# Patient Record
Sex: MALE | Race: WHITE | URBAN - METROPOLITAN AREA
[De-identification: names, ages, dates, MRNs, and addresses within clinical notes are randomized per-mention and may not be internally consistent; named-entity substitution may affect disease eponyms.]

---

## 2024-11-19 ENCOUNTER — OFFICE VISIT (OUTPATIENT)
Dept: URGENT CARE | Age: 29
End: 2024-11-19
Payer: COMMERCIAL

## 2024-11-19 VITALS
RESPIRATION RATE: 16 BRPM | OXYGEN SATURATION: 98 % | HEART RATE: 64 BPM | DIASTOLIC BLOOD PRESSURE: 79 MMHG | SYSTOLIC BLOOD PRESSURE: 114 MMHG | TEMPERATURE: 97.5 F

## 2024-11-19 DIAGNOSIS — J01.90 ACUTE NON-RECURRENT SINUSITIS, UNSPECIFIED LOCATION: Primary | ICD-10-CM

## 2024-11-19 PROCEDURE — 99203 OFFICE O/P NEW LOW 30 MIN: CPT | Performed by: PHYSICIAN ASSISTANT

## 2024-11-19 RX ORDER — AMOXICILLIN AND CLAVULANATE POTASSIUM 875; 125 MG/1; MG/1
1 TABLET, FILM COATED ORAL 2 TIMES DAILY
Qty: 20 TABLET | Refills: 0 | Status: SHIPPED | OUTPATIENT
Start: 2024-11-19 | End: 2024-11-29

## 2024-11-19 ASSESSMENT — ENCOUNTER SYMPTOMS
WHEEZING: 0
RHINORRHEA: 1
SHORTNESS OF BREATH: 0
EYE DISCHARGE: 0
COUGH: 0
SINUS PRESSURE: 1
CHEST TIGHTNESS: 0
EYE REDNESS: 0
FEVER: 0
SORE THROAT: 0
CHILLS: 0

## 2024-11-19 NOTE — PROGRESS NOTES
Subjective   Patient ID: Marcio He is a 29 y.o. male. They present today with a chief complaint of Sinus Problem (X 2-2.5wks).    History of Present Illness  Patient presents for possible sinus infection. He states he started with cold symptoms approximately 2 weeks ago. He states that he has had ongoing body aches, slight runny nose, headaches with slight sinus pressure. He denies any fevers, chills, ear pain, sore throat cough. He has tried over the counter cold medicine without relief.          Past Medical History  Allergies as of 11/19/2024    (No Known Allergies)       (Not in a hospital admission)       Past Medical History:   Diagnosis Date    Encounter for general adult medical examination without abnormal findings 12/04/2020    Routine general medical examination at a health care facility    Other conditions influencing health status     No significant past medical history    Other constipation 12/04/2020    Other constipation    Shortness of breath 12/04/2020    Mild shortness of breath       Past Surgical History:   Procedure Laterality Date    OTHER SURGICAL HISTORY  07/22/2019    Hernia repair    OTHER SURGICAL HISTORY  12/04/2020    Heron tooth extraction            Review of Systems  Review of Systems   Constitutional:  Negative for chills and fever.   HENT:  Positive for congestion, rhinorrhea and sinus pressure. Negative for ear pain and sore throat.    Eyes:  Negative for discharge and redness.   Respiratory:  Negative for cough, chest tightness, shortness of breath and wheezing.                                   Objective    Vitals:    11/19/24 0838   BP: 114/79   Pulse: 64   Resp: 16   Temp: 36.4 °C (97.5 °F)   SpO2: 98%     No LMP for male patient.    Physical Exam  Constitutional:       General: He is not in acute distress.     Appearance: Normal appearance. He is not ill-appearing.   HENT:      Right Ear: Tympanic membrane, ear canal and external ear normal.      Left Ear: Tympanic  membrane, ear canal and external ear normal.      Nose: Septal deviation, congestion and rhinorrhea present.      Right Sinus: Maxillary sinus tenderness present.      Left Sinus: Maxillary sinus tenderness present.      Mouth/Throat:      Pharynx: No pharyngeal swelling, oropharyngeal exudate or posterior oropharyngeal erythema.      Tonsils: No tonsillar exudate or tonsillar abscesses.   Eyes:      General:         Right eye: No discharge.         Left eye: No discharge.      Conjunctiva/sclera: Conjunctivae normal.   Cardiovascular:      Rate and Rhythm: Normal rate and regular rhythm.      Pulses: Normal pulses.      Heart sounds: Normal heart sounds.   Pulmonary:      Effort: Pulmonary effort is normal. No respiratory distress.      Breath sounds: Normal breath sounds. No wheezing, rhonchi or rales.   Lymphadenopathy:      Cervical: No cervical adenopathy.   Neurological:      Mental Status: He is alert and oriented to person, place, and time.         Procedures    Point of Care Test & Imaging Results from this visit  No results found for this visit on 11/19/24.   No results found.    Diagnostic study results (if any) were reviewed by Angely Lovelace PA-C.    Assessment/Plan   Allergies, medications, history, and pertinent labs/EKGs/Imaging reviewed by Angely Lovelace PA-C.     Medical Decision Making  Patient presents with signs and symptoms consistent with sinusitis. Given length of symptoms and lack of improvement with OTC symptomatic treatment, it seemed reasonable to treat with course of antibiotics. Discussed may continue with OTC symptomatic relief. Discussed to follow up with PCP if symptoms are not improving over the next 3-5 days, earlier with any acute worsening.      Orders and Diagnoses  Diagnoses and all orders for this visit:  Acute non-recurrent sinusitis, unspecified location  -     amoxicillin-pot clavulanate (Augmentin) 875-125 mg tablet; Take 1 tablet by mouth 2 times a day for 10  days.      Medical Admin Record      Patient disposition: Home    Electronically signed by Angely Lovelace PA-C  8:47 AM

## 2025-05-01 ENCOUNTER — OFFICE VISIT (OUTPATIENT)
Age: 30
End: 2025-05-01
Payer: COMMERCIAL

## 2025-05-01 VITALS
TEMPERATURE: 97.8 F | RESPIRATION RATE: 17 BRPM | OXYGEN SATURATION: 98 % | DIASTOLIC BLOOD PRESSURE: 68 MMHG | SYSTOLIC BLOOD PRESSURE: 102 MMHG | BODY MASS INDEX: 23.06 KG/M2 | HEART RATE: 76 BPM | HEIGHT: 73 IN | WEIGHT: 174 LBS

## 2025-05-01 DIAGNOSIS — G47.10 HYPERSOMNIA: ICD-10-CM

## 2025-05-01 DIAGNOSIS — G47.30 SLEEP-DISORDERED BREATHING: Primary | ICD-10-CM

## 2025-05-01 PROCEDURE — 99203 OFFICE O/P NEW LOW 30 MIN: CPT | Performed by: INTERNAL MEDICINE

## 2025-05-01 NOTE — PROGRESS NOTES
NEW PATIENT VISIT-PULMONARY/SLEEP    2025     REFERRING PHYSICIAN:  Alvaro Haynes MD     REASON FOR REFERRAL:  Hypersomnia    HPI:     Ivan Lawrence is a 29 y.o. male who was referred to sleep clinic for evaluation.     He has been having issues with hypersomnia and fatigue during the day.  He believes he gets enough sleep but he does not feel like it during the day.      Sleep:   Patient snores but not sure of witnessed apnea. Patient wakes up gasping for air. Bed time: 12 midnight, wake up time: 7-8 AM. Patient falls asleep in few minutes. Patient wakes up 0-1 times during the night. Patient does not wake up refreshed. Patient feels sleepy during the day and might take naps on the weekend.  Denies accidents or near misses.  Bivins Sleepiness Scale: .  STOP-BAN/8.    Denies any respiratory issues.          Past Medical History   Past Medical History:   Diagnosis Date    Acne        Past Surgical History  Past Surgical History:   Procedure Laterality Date    HERNIA REPAIR      Age 2       Allergies  No Known Allergies    Medications  Current Outpatient Medications   Medication Sig Dispense Refill    hydrOXYzine HCl (ATARAX) 10 MG tablet Take 1 tablet by mouth every 6 hours as needed      Cholecalciferol (VITAMIN D3) 1.25 MG (60537 UT) CAPS Take 1 capsule by mouth Twice a Week 12 capsule 1    Menatetrenone (VITAMIN K2) 100 MCG TABS Take 1 tablet by mouth daily 90 tablet 1     No current facility-administered medications for this visit.       Social History  Social History     Tobacco Use    Smoking status: Never    Smokeless tobacco: Never   Substance Use Topics    Alcohol use: Yes       Family History  None pertinent    Review of Systems  All review of systems has been obtained and negative other than what was mentionedin HPI.   Physical Exam                 Vitals:    25 1351   BP: 102/68   Pulse: 76   Resp: 17   Temp: 97.8 °F (36.6 °C)   TempSrc: Tympanic   SpO2: 98%

## 2025-05-09 ENCOUNTER — OFFICE VISIT (OUTPATIENT)
Age: 30
End: 2025-05-09
Payer: COMMERCIAL

## 2025-05-09 VITALS
TEMPERATURE: 96.8 F | BODY MASS INDEX: 23.84 KG/M2 | OXYGEN SATURATION: 98 % | SYSTOLIC BLOOD PRESSURE: 100 MMHG | RESPIRATION RATE: 15 BRPM | HEART RATE: 83 BPM | HEIGHT: 72 IN | WEIGHT: 176 LBS | DIASTOLIC BLOOD PRESSURE: 70 MMHG

## 2025-05-09 DIAGNOSIS — R09.82 POSTNASAL DRIP: Primary | ICD-10-CM

## 2025-05-09 PROCEDURE — 99202 OFFICE O/P NEW SF 15 MIN: CPT | Performed by: STUDENT IN AN ORGANIZED HEALTH CARE EDUCATION/TRAINING PROGRAM

## 2025-05-09 NOTE — PROGRESS NOTES
[facility], [facility] OTOLARYNGOLOGY  [address] [place] [address], [address]  [facility] [address]  Dept: [phone]  Dept Fax: [phone]  Loc: [phone]     5/9/2025    Visit type: New patient    Reason for Visit: Sinus Problem and New Patient       ASSESSMENT/PLAN   1. Postnasal drip    No orders of the defined types were placed in this encounter.     Maximize nasal regimen (nasal steroid/saline)   Agree with sleep apnea testing    Subjective    Patient: Ivan Lawrence is a 29 y.o. male   HPI:    Referred by: Alvaro Haynes MD  I have personally reviewed the note by the referring provider     6 month fluctuating feeling in sinus   \" Sensation of inhaling water\" \"deep irritation\" pressure. Causes slight off balance   Usually lasts a few weeks     Presented to Urgent Care and was treated with abx without much benefit (no worse)   In the process of getting tested for sleep apnea     History of broken nose at 9yrs old (baseball to face)     Denies nasal obstruction. Right side feels more open   At times will have nasal drainage (when sick)   Endorses some postnasal drip   Endorses normal sense of smell   Tried neti pot for a bit     No ear concerns other than cerumen     No Known Allergies    Current Outpatient Medications:     hydrOXYzine HCl (ATARAX) 10 MG tablet, Take 1 tablet by mouth every 6 hours as needed, Disp: , Rfl:     Cholecalciferol (VITAMIN D3) 1.25 MG (72060 UT) CAPS, Take 1 capsule by mouth Twice a Week, Disp: 12 capsule, Rfl: 1    Menatetrenone (VITAMIN K2) 100 MCG TABS, Take 1 tablet by mouth daily, Disp: 90 tablet, Rfl: 1   Past Medical History:   Diagnosis Date    Acne      Past Surgical History:   Procedure Laterality Date    HERNIA REPAIR      Age 2     History reviewed. No pertinent family history.  Social History     Tobacco Use    Smoking status: Never    Smokeless tobacco: Never   Substance Use Topics    Alcohol use: Yes       PMH,

## 2025-05-21 ENCOUNTER — HOSPITAL ENCOUNTER (OUTPATIENT)
Dept: SLEEP CENTER | Age: 30
Discharge: HOME | End: 2025-05-23
Payer: COMMERCIAL

## 2025-05-21 DIAGNOSIS — G47.30 SLEEP-DISORDERED BREATHING: ICD-10-CM

## 2025-05-21 PROCEDURE — 95806 SLEEP STUDY UNATT&RESP EFFT: CPT

## 2025-06-19 ENCOUNTER — TELEMEDICINE (OUTPATIENT)
Age: 30
End: 2025-06-19
Payer: COMMERCIAL

## 2025-06-19 DIAGNOSIS — G47.10 HYPERSOMNIA: Primary | ICD-10-CM

## 2025-06-19 PROCEDURE — 99213 OFFICE O/P EST LOW 20 MIN: CPT | Performed by: INTERNAL MEDICINE
